# Patient Record
Sex: MALE | Race: WHITE | URBAN - METROPOLITAN AREA
[De-identification: names, ages, dates, MRNs, and addresses within clinical notes are randomized per-mention and may not be internally consistent; named-entity substitution may affect disease eponyms.]

---

## 2023-02-16 ENCOUNTER — OFFICE VISIT (OUTPATIENT)
Dept: FAMILY MEDICINE CLINIC | Age: 33
End: 2023-02-16

## 2023-02-16 VITALS
TEMPERATURE: 98 F | HEIGHT: 66 IN | RESPIRATION RATE: 18 BRPM | WEIGHT: 195 LBS | BODY MASS INDEX: 31.34 KG/M2 | SYSTOLIC BLOOD PRESSURE: 120 MMHG | HEART RATE: 62 BPM | OXYGEN SATURATION: 98 % | DIASTOLIC BLOOD PRESSURE: 72 MMHG

## 2023-02-16 DIAGNOSIS — G25.89 SCAPULAR DYSKINESIS: ICD-10-CM

## 2023-02-16 DIAGNOSIS — G89.29 CHRONIC RIGHT SHOULDER PAIN: ICD-10-CM

## 2023-02-16 DIAGNOSIS — S43.109A ACROMIOCLAVICULAR JOINT SEPARATION, TYPE 1, INITIAL ENCOUNTER: Primary | ICD-10-CM

## 2023-02-16 DIAGNOSIS — M25.511 CHRONIC RIGHT SHOULDER PAIN: ICD-10-CM

## 2023-02-16 PROCEDURE — 99203 OFFICE O/P NEW LOW 30 MIN: CPT | Performed by: FAMILY MEDICINE

## 2023-02-16 NOTE — PROGRESS NOTES
Identified Patient with two Patient identifiers (Name and ). Two Patient Identifiers confirmed. Reviewed record in preparation for visit and have obtained necessary documentation. Chief Complaint   Patient presents with    Shoulder Pain     Right side x4 months - Patient states he noticed shoulder pain the day after doing martial arts. .. initially shoulder locked up but gradually got more ROM. Patient still goes to the gym but endorses pain. Visit Vitals  /72 (BP 1 Location: Right arm, BP Patient Position: Sitting, BP Cuff Size: Large adult)   Pulse 62   Temp 98 °F (36.7 °C) (Oral)   Resp 18   Ht 5' 6\" (1.676 m)   Wt 195 lb (88.5 kg)   SpO2 98%   BMI 31.47 kg/m²       1. Have you been to the ER, urgent care clinic since your last visit? Hospitalized since your last visit? No NEW PATIENT    2. Have you seen or consulted any other health care providers outside of the [address] [place] [address] since your last visit? Include any pap smears or colon screening.  No NEW PATIENT

## 2023-02-16 NOTE — PROGRESS NOTES
History of Present Illness     Chief Complaint   Patient presents with    Shoulder Pain     Right side x4 months - Patient states he noticed shoulder pain the day after doing martial arts. .. initially shoulder locked up but gradually got more ROM. Patient still goes to the gym but endorses pain. Patient Identification  Shamir Michle is a 28 y.o. male complains of right shoulder pain. Date of Onset: 4 months  Mechanism of Injury: Injured doing [employer]   Alleviating Factors: rest  Aggravating Factors: overhead activities     Imaging: None    History reviewed. No pertinent past medical history. Family History   Problem Relation Age of Onset    Migraines Mother     No Known Problems Father        Allergies   Allergen Reactions    Tree Nut Anaphylaxis         Physical Exam     Visit Vitals  /72 (BP 1 Location: Right arm, BP Patient Position: Sitting, BP Cuff Size: Large adult)   Pulse 62   Temp 98 °F (36.7 °C) (Oral)   Resp 18   Ht 5' 6\" (1.676 m)   Wt 195 lb (88.5 kg)   SpO2 98%   BMI 31.47 kg/m²     GEN: Well appearing. No apparent distress. Responds to all questions appropriately. Lungs: No labored respirations. Talking in  complete sentences without difficulty.     Shoulder: Right    Deformity: None    ROM:  Forward Flexion: Active: 180 Passive: 180  ER at 90 degrees abduction: Active: 90 Passive:90  IR at 90 degrees abduction: Active: 90 Passive:90  Abduction: Active: 180 Passive: 180    Scapular Motion: Dyskinesia noted    Palpation:  AC tenderness: Tender  SC tenderness: None  Clavicle tenderness: None  Biceps tenderness: None    Strength:  Empty Can(Supraspinatus): Left:5/5 Right:5/5  External rotation(Infraspinatus): Left:5/5 Right: 5/5  Lift off(Subscapularis): Left:5/5 Right: 5/5    Rotator Cuff Exam:  Neers sign: Negative  Arthur sign: Negative  Painful Arc: Negative  Lift-off sign / Belly Press: Negative  Biceps/Labrum/AC Exam:  Yergasons Test: Positive  Speeds Test: Negative  ORockys Sign: Negative  Cross-chest adduction: Positive  Scarf Test: Positive    Instability:  Anterior apprehension: Negative  Relocation test: Negative  Posterior apprehension: Negative  Anterior drawer: Negative  Posterior drawer: Negative    Neuro/Vascular:  Pulses intact, no edema, and neurologically intact  Skin: No obvious rash or skin breakdown     Ultrasound of Shoulder     Indication: Right Shoulder Pain     Biceps Tendon:    Short Axis:  Normal hyperechoic appearance noted with no halo sign  Long Axis:   Uniform fibular appearance noted with no thickening                            Supraspinatus:    Short Axis: Normal tessellate fibular appearance noted  Long Axis:  Uniform linear fibular bird-beak appearance noted with no cortical irregularity      Infraspinatus:  Short Axis: Normal tessellate fibular appearance noted  Long Axis: Uniform fibular bird-beak appearance noted with no cortical irregularity      Subscapularis:   Short Axis: Normal tessellate fibular appearance noted, three heads of subscapularis identified  Long Axis: Uniform fibular bird-beak appearance noted with no cortical irregularity      AC Joint: Moderate effusion noted right. AC joint with 0.7 cm on the right and 0.4 cm on the left. Impingement View: Smooth gliding of supraspinatus visualized under the acromion noted with no signs of impingement     Interval View: Supraspinatus, biceps tendon and subscapularis identified with no irregularities noted     Posterior View: Posterior labram impingement noted without effusion     Impression: Normal rotator cuff, AC joint widening with moderate effusion    Ultrasound Performed and Interpreted by:  Nolvia Mott MD, CAQSM, RMSK        No results found for any visits on 02/16/23. Assessment:    ICD-10-CM ICD-9-CM    1. Acromioclavicular joint separation, type 1, initial encounter  S43.109A 831.04 XR SHOULDER RT AP/LAT MIN 2 V      REFERRAL TO PHYSICAL THERAPY      2. Scapular dyskinesis  G25.89 781.3 REFERRAL TO PHYSICAL THERAPY      3. Chronic right shoulder pain  M25.511 719.41 XR SHOULDER RT AP/LAT MIN 2 V    G89.29 338.29           Plan:  -This far out can hold off on x-ray at this time. Start home exercise program and reviewed this with the patient and given bands as well. Try this for about 6 weeks if no improvement or worsening, get x-ray. Also at that time, if x-ray negative, recommend formal physical therapy. -Does have significant scapular dyskinesia on the right side as well. Shoulder squeezes and other shoulder training exercises reviewed with patient. Follow-up and Dispositions    Return in about 2 months (around 4/16/2023) for Shoulder pain if no improvement or worsening . Patient encounter was >30 minutes was spent of total time spent on the date of the encounter: preparing to see the patient(reviewing prior notes and tests), obtaining and/or reviewing separately obtained history, performing a medially appropriate examination and/or evaluation, counseling and educating the patient, ordering tests, documenting clinical information in the electronic or other health record, independently interpreting results(not separately reported) and communicating results to the patient/family/caregiver.

## 2023-04-27 ENCOUNTER — APPOINTMENT (OUTPATIENT)
Dept: PHYSICAL THERAPY | Age: 33
End: 2023-04-27